# Patient Record
Sex: FEMALE | Race: WHITE | NOT HISPANIC OR LATINO | Employment: OTHER | URBAN - METROPOLITAN AREA
[De-identification: names, ages, dates, MRNs, and addresses within clinical notes are randomized per-mention and may not be internally consistent; named-entity substitution may affect disease eponyms.]

---

## 2017-03-03 ENCOUNTER — TRANSFERRED RECORDS (OUTPATIENT)
Dept: HEALTH INFORMATION MANAGEMENT | Facility: CLINIC | Age: 67
End: 2017-03-03

## 2018-01-19 ENCOUNTER — TRANSFERRED RECORDS (OUTPATIENT)
Dept: HEALTH INFORMATION MANAGEMENT | Facility: CLINIC | Age: 68
End: 2018-01-19

## 2018-09-05 ENCOUNTER — TRANSFERRED RECORDS (OUTPATIENT)
Dept: HEALTH INFORMATION MANAGEMENT | Facility: CLINIC | Age: 68
End: 2018-09-05

## 2021-06-17 ENCOUNTER — OFFICE VISIT (OUTPATIENT)
Dept: OPHTHALMOLOGY | Facility: CLINIC | Age: 71
End: 2021-06-17
Attending: OPHTHALMOLOGY
Payer: MEDICARE

## 2021-06-17 DIAGNOSIS — Q15.0 CONGENITAL GLAUCOMA OF LEFT EYE: Primary | ICD-10-CM

## 2021-06-17 DIAGNOSIS — Q11.1 ANOPHTHALMOS OF RIGHT EYE: ICD-10-CM

## 2021-06-17 DIAGNOSIS — Z96.1 PSEUDOPHAKIA OF LEFT EYE: ICD-10-CM

## 2021-06-17 DIAGNOSIS — H52.12 HIGH MYOPIA, LEFT EYE: ICD-10-CM

## 2021-06-17 PROCEDURE — G0463 HOSPITAL OUTPT CLINIC VISIT: HCPCS

## 2021-06-17 PROCEDURE — 92133 CPTRZD OPH DX IMG PST SGM ON: CPT | Performed by: OPHTHALMOLOGY

## 2021-06-17 PROCEDURE — 92083 EXTENDED VISUAL FIELD XM: CPT | Performed by: OPHTHALMOLOGY

## 2021-06-17 PROCEDURE — 99204 OFFICE O/P NEW MOD 45 MIN: CPT | Mod: GC | Performed by: OPHTHALMOLOGY

## 2021-06-17 RX ORDER — MAGNESIUM CITRATE
POWDER (GRAM) MISCELLANEOUS
COMMUNITY

## 2021-06-17 ASSESSMENT — REFRACTION_WEARINGRX
SPECS_TYPE: BIFOCAL
OS_AXIS: 060
OS_SPHERE: -6.50
OS_CYLINDER: +1.50
OS_ADD: +2.25

## 2021-06-17 ASSESSMENT — VISUAL ACUITY
CORRECTION_TYPE: GLASSES
OS_PH_CC: 20/20
OS_CC: 20/25
OD_CC: PROSTHETIC
OS_CC+: -2
METHOD: SNELLEN - LINEAR

## 2021-06-17 ASSESSMENT — CONF VISUAL FIELD
OD_SUPERIOR_TEMPORAL_RESTRICTION: 1
OS_NORMAL: 1
OD_SUPERIOR_NASAL_RESTRICTION: 1
OD_INFERIOR_TEMPORAL_RESTRICTION: 1
OD_INFERIOR_NASAL_RESTRICTION: 1

## 2021-06-17 ASSESSMENT — TONOMETRY
OD_IOP_MMHG: - -
OD_IOP_MMHG: --
OS_IOP_MMHG: 14
OS_IOP_MMHG: 16
IOP_METHOD: APPLANATION
IOP_METHOD: APPLANATION BY JMK

## 2021-06-17 ASSESSMENT — SLIT LAMP EXAM - LIDS
COMMENTS: NORMAL
COMMENTS: PROSTHESIS

## 2021-06-17 ASSESSMENT — CUP TO DISC RATIO: OS_RATIO: 0.4

## 2021-06-17 ASSESSMENT — EXTERNAL EXAM - RIGHT EYE: OD_EXAM: NORMAL

## 2021-06-17 ASSESSMENT — EXTERNAL EXAM - LEFT EYE: OS_EXAM: NORMAL

## 2021-06-17 NOTE — PROGRESS NOTES
Chief Complaint/Presenting Concern: Glaucoma evaluation    History of Present Illness:   Amie Chi is a 71 year old patient who presents for evaluation of glaucoma. Patient was previously living in [place], [place], and followed by Dr[facility], . Patient has bilateral congenital glaucoma and had surgery on both eyes at age 3 yo. 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 She is currently on Xelpros in the left eye at bedtime and has been on it for the past 1-2 years. She has tried latanoprost and another drop in the past but was allergic to the preservative, so she was switched to Xelpros.  Last eye exam was 3 months ago; had visual field at that time and was told it was stable for the past several years.     Relevant Past Medical/Family/Social History: CAD, HLD, hypothyroidism, GERD  Retired     Relevant Review of Systems: None relevant     Diagnosis: Congenital glaucoma left eye   Year diagnosis: 1951  Previous glaucoma surgery/laser  - glaucoma surgery age 3yo - likely ab externo trabeculotomy +/- trabeculectomy   Maximum intraocular pressure: 20 mmHg  Current Meds: Zelpros at bedtime in LEFT eye  Family history: negative  Gonio:  Left eye: superior PAS, otherwise wide open to CBB  Refractive status: Axial myopia  Trauma history: negative  Steroid exposure: negative  Vasospastic disease: Migrane/Raynaud phenomenon: positive as teenager, now she developed occasional ocular migraines  A past hemodynamic crisis or Low BP: negative  Meds AEs/intolerance: Xalatan (unable to tolerate due to preservative)  PMHx: No history of Asthma and respiratory problems/Cardiac/Renal/Kidney stones/Sulfa Allergy  Anticoagulants: ASA 81 mg     Today's testing:  IOP 14 mmHg by applanation  Visual field June 17, 2021:  Left eye (baseline) - depressed MD, Inferior arcuate, reliable  OCT Optic Nerve RNFL Spectralis June 17,  2021  left eye (baseline): poor tracing due to tilted nerve/myopia    Additional Ocular History:     2. Anophthalmos, right eye  - s/p enucleation ~1965 for recurrent corneal ulcer     3. High myopia with presbyopia      4. Pseudophakia left eye    - both eyes ~1996    5. History of retinal detachment, left eye   - scleral buckle?, ?PPV    6. Mild Epiretinal membrane    Plan/Recommendations:    Discussed findings with patient. IOP stable, within usual range as per patient. She will be sending over her records from [place].    Continue Xelpros at bedtime in the LEFT eye    Patient reports chronic photophobia that has worsened now as she moved to [place] due to more sunlight. Advised the patient to use artificial tears and assess if this help. Also advised to get new set of glasses that can help with UV exposure, will refer for refraction.    RTC in 4 months [facility], IOP    Jonathon Franco MD  Ophthalmology PGY-3  [facility]     Physician Attestation     Attending Physician Attestation:  Complete documentation of historical and exam elements from today's encounter can be found in the full encounter summary report (not reduplicated in this progress note). I personally obtained the chief complaint(s) and history of present illness. I confirmed and edited as necessary the review of systems, past medical/surgical history, family history, social history, and examination findings as documented by others; and I examined the patient myself. I personally reviewed the relevant tests, images, and reports as documented above. I personally reviewed the ophthalmic test(s) associated with this encounter, agree with the interpretation(s) as documented by the resident/fellow and have edited the corresponding report(s) as necessary. I formulated and edited as necessary the assessment and plan and discussed the findings and management plan with the patient and any family members present at the time of the visit.  Julio  JEAN CLAUDE Jesus., Glaucoma, June 17, 2021

## 2021-06-17 NOTE — NURSING NOTE
Chief Complaints and History of Present Illnesses   Patient presents with     Glaucoma     Chief Complaint(s) and History of Present Illness(es)     Glaucoma     Laterality: left eye    Quality: fluctuating    Treatment side effects: blurred vision    Compliance with Treatment: always              Comments     New pt here today establishing care, transferring from [place], [place].   HONEY was about 3 mos ago with Dr. [facility]te [facility] the [facility],  ([place], [place]).  Pt states was diagnosed around age 2 with bilateral congenital glaucoma - surgery was done.  During 4th grade & 9th grade had recurring corneal ulcer which resulted in enucleation of the RE.  Prosthetic currently in place. Was on pilocarpine until first year of college when drops were stopped.    Currently using Xelpros LE 1 gtts  Q HS. Pt has copy of drops Rx with her today.  Here today for glaucoma assessment & continuance of care.    Jayne ADAMS, PAGE 12:30 PM 06/17/2021

## 2021-10-19 ENCOUNTER — OFFICE VISIT (OUTPATIENT)
Dept: OPHTHALMOLOGY | Facility: CLINIC | Age: 71
End: 2021-10-19
Attending: OPHTHALMOLOGY
Payer: MEDICARE

## 2021-10-19 DIAGNOSIS — Q15.0 CONGENITAL GLAUCOMA OF LEFT EYE: Primary | ICD-10-CM

## 2021-10-19 DIAGNOSIS — H52.12 HIGH MYOPIA, LEFT EYE: ICD-10-CM

## 2021-10-19 DIAGNOSIS — Q11.1 ANOPHTHALMOS OF RIGHT EYE: ICD-10-CM

## 2021-10-19 DIAGNOSIS — Z96.1 PSEUDOPHAKIA OF LEFT EYE: ICD-10-CM

## 2021-10-19 PROCEDURE — G0463 HOSPITAL OUTPT CLINIC VISIT: HCPCS

## 2021-10-19 PROCEDURE — 99213 OFFICE O/P EST LOW 20 MIN: CPT | Performed by: OPHTHALMOLOGY

## 2021-10-19 ASSESSMENT — CONF VISUAL FIELD
OD_SUPERIOR_TEMPORAL_RESTRICTION: 1
OD_INFERIOR_NASAL_RESTRICTION: 1
METHOD: COUNTING FINGERS
OD_SUPERIOR_NASAL_RESTRICTION: 1
OD_INFERIOR_TEMPORAL_RESTRICTION: 1
OS_NORMAL: 1

## 2021-10-19 ASSESSMENT — VISUAL ACUITY
OD_CC: PROSTHETIC
OS_CC: 20/30
OS_PH_CC: 20/30
CORRECTION_TYPE: GLASSES
METHOD: SNELLEN - LINEAR
OS_PH_CC+: +1

## 2021-10-19 ASSESSMENT — SLIT LAMP EXAM - LIDS
COMMENTS: NORMAL
COMMENTS: PROSTHESIS

## 2021-10-19 ASSESSMENT — TONOMETRY
OS_IOP_MMHG: 12
OD_IOP_MMHG: PROS
OD_IOP_MMHG: PROS
IOP_METHOD: APPLANATION
OS_IOP_MMHG: 13
IOP_METHOD: APPLANATION

## 2021-10-19 ASSESSMENT — REFRACTION_WEARINGRX
SPECS_TYPE: BIFOCAL
OS_ADD: +2.25
OS_AXIS: 060
OS_SPHERE: -6.50
OS_CYLINDER: +1.50

## 2021-10-19 ASSESSMENT — EXTERNAL EXAM - RIGHT EYE: OD_EXAM: NORMAL

## 2021-10-19 ASSESSMENT — CUP TO DISC RATIO: OS_RATIO: 0.4

## 2021-10-19 ASSESSMENT — EXTERNAL EXAM - LEFT EYE: OS_EXAM: NORMAL

## 2021-10-19 NOTE — PROGRESS NOTES
Patient was previously living in [place], [place], and followed by Dr. [facility], . Patient has bilateral congenital glaucoma and had surgery on both eyes at age 3 yo. 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 She is currently on Xelpros in the left eye at bedtime and has been on it for the past 1-2 years. She has tried latanoprost and another drop in the past but was allergic to the preservative, so she was switched to Xelpros.  Last eye exam was 3 months ago; had visual field at that time and was told it was stable for the past several years.     Chief Complaint/Presenting Concern: Glaucoma evaluation    History of Present Illness:   Amie Chi is a 71 year old patient who presents for evaluation of glaucoma. Since last appointment, vision stable. No new eye pain, flashes, floaters. She is using xelpros at bedtime left eye.     Relevant Past Medical/Family/Social History: CAD, HLD, hypothyroidism, GERD  Retired     Relevant Review of Systems: None relevant     Diagnosis: Congenital glaucoma left eye   Year diagnosis: 1951  Previous glaucoma surgery/laser  - glaucoma surgery age 3yo - likely ab externo trabeculotomy +/- trabeculectomy   Maximum intraocular pressure: 20 mmHg  Current Meds: Zelpros at bedtime in LEFT eye  Family history: negative  Gonio:  Left eye: superior PAS, otherwise wide open to CBB  Refractive status: Axial myopia  Trauma history: negative  Steroid exposure: negative  Vasospastic disease: Migrane/Raynaud phenomenon: positive as teenager, now she developed occasional ocular migraines  A past hemodynamic crisis or Low BP: negative  Meds AEs/intolerance: Xalatan (unable to tolerate due to preservative)  PMHx: No history of Asthma and respiratory problems/Cardiac/Renal/Kidney stones/Sulfa Allergy  Anticoagulants: ASA 81 mg  Visual field June 17, 2021:  Left eye (baseline) - depressed MD,  Inferior arcuate, reliable  OCT Optic Nerve RNFL Spectralis June 17, 2021  left eye (baseline): poor tracing due to tilted nerve/myopia    Today's testing:  IOP 12 mmHg by applanation    Additional Ocular History:     2. Anophthalmos, right eye  - s/p enucleation ~1965 for recurrent corneal ulcer     3. High myopia with presbyopia      4. Pseudophakia left eye    - both eyes ~1996    5. History of retinal detachment, left eye   - scleral buckle?, ?PPV    6. Mild Epiretinal membrane    Plan/Recommendations:    Discussed findings with patient. IOP stable, within usual range as per patient.    Continue Xelpros at bedtime in the LEFT eye    RTC in 6 months VF left eye     Kathleen Moon MD  Ophthalmology Resident, PGY-3      Physician Attestation     Attending Physician Attestation:  Complete documentation of historical and exam elements from today's encounter can be found in the full encounter summary report (not reduplicated in this progress note). I personally obtained the chief complaint(s) and history of present illness. I confirmed and edited as necessary the review of systems, past medical/surgical history, family history, social history, and examination findings as documented by others; and I examined the patient myself. I personally reviewed the relevant tests, images, and reports as documented above. I formulated and edited as necessary the assessment and plan and discussed the findings and management plan with the patient and any family members present at the time of the visit.  Julio Jesus M.D., Glaucoma, October 19, 2021

## 2021-10-19 NOTE — NURSING NOTE
Chief Complaints and History of Present Illnesses   Patient presents with     Glaucoma Follow Up     4 month follow up both eyes.     Chief Complaint(s) and History of Present Illness(es)     Glaucoma Follow Up     Comments: 4 month follow up both eyes.              Comments     Pt states vision is about the same as last visit. No eye pain today.  No flashes or floaters. No redness or dryness.    MEGHA Mcpherson October 19, 2021 12:06 PM

## 2021-11-16 ENCOUNTER — TELEPHONE (OUTPATIENT)
Dept: OPHTHALMOLOGY | Facility: CLINIC | Age: 71
End: 2021-11-16
Payer: MEDICARE

## 2021-11-16 NOTE — TELEPHONE ENCOUNTER
[facility] Call Center    Phone Message    May a detailed message be left on voicemail: yes     Reason for Call: Other: Pt calling in as, per the clinic, she needed to r/s her Appt with Dr. Jesus for February. Pt states that she only needs to see a tech for a pressure check around 1/19/22. Please call pt to schedule tech only Appt around that date. Thank you.     Action Taken: Message routed to:  [facility] EYE    Travel Screening: Not Applicable

## 2021-11-16 NOTE — TELEPHONE ENCOUNTER
Called and spoke to Amie.     Spoke to Amie in regards to a Tech appt for pressure check. Amie was not feeling any pain or increase pressure in her eye but bf I made a tech appt for Amie I asked Dr. Claros's team if Amie needed to come in for a pressure check. Her team did not feel she needed to come in at this time. I relayed this message to Amie and I gave her my direct number if she feels any pressure or increase eye pain I would make her an appt. Amie was agreeable to this.     Jennifer Baxter Communication Facilitator on 11/16/2021 at 10:24 AM

## 2022-04-08 DIAGNOSIS — Q15.0 CONGENITAL GLAUCOMA OF LEFT EYE: Primary | ICD-10-CM

## 2022-04-08 RX ORDER — LATANOPROST 0.05 MG/ML
1 SOLUTION/ DROPS OPHTHALMIC; TOPICAL AT BEDTIME
Qty: 2.5 ML | Refills: 5 | Status: SHIPPED | OUTPATIENT
Start: 2022-04-08

## 2022-04-08 RX ORDER — LATANOPROST 0.05 MG/ML
SOLUTION/ DROPS OPHTHALMIC; TOPICAL
Status: CANCELLED | OUTPATIENT
Start: 2022-04-08

## 2022-04-08 NOTE — TELEPHONE ENCOUNTER
[facility] Call Center    Phone Message    May a detailed message be left on voicemail: yes     Reason for Call: Other: Patient called in with information for pharmacy where she wants her prescription filled at New phone number, [phone] New Fax [phone] Pharmacy's name [facility] Thank you     Must be sent over by 2:00pm as pt is out and needs ASAP    Action Taken: Message routed to:  [facility] E[facility]    Travel Screening: Not Applicable

## 2022-04-08 NOTE — TELEPHONE ENCOUNTER
[facility] Call Center    Phone Message    May a detailed message be left on voicemail: yes     Reason for Call: Other:  Pt called back to give information on another Pharmacy in [place].  [facility], [place], [place], Phone number, [phone].  Pt wanted to give another option for pharmacy that could fill the prescription of Xelpros 125mcg 0.05% eye drops, per Pt.  Please call Pt back to discuss.    Thank you.     Action Taken: Message routed to:  [facility]    Travel Screening: Not Applicable

## 2022-04-08 NOTE — TELEPHONE ENCOUNTER
[facility] Call Center    Phone Message    May a detailed message be left on voicemail: yes     Reason for Call: Medication Refill Request    Has the patient contacted the pharmacy for the refill? Yes   Name of medication being requested: Xelpros 125mcg 0.05%  Provider who prescribed the medication: Dr Jesus  Pharmacy: [facility]  [facility][note 3[facility] [phone]  Date medication is needed:   Pt is out of eye drops and is concerned that her eyes will now get worse if she doesn't get the Rx asap. She has tried latanoprost and another drop in the past but was allergic to the preservative, so she was switched to Xelpros. She would like the Rx to be sent to her local pharmacy instead of the [place] pharmacy on file.   Local pharmacy:   [facility], [address]  [phone]      Action Taken: Message routed to:  [facility]    Travel Screening: Not Applicable

## 2022-04-08 NOTE — TELEPHONE ENCOUNTER
Latanoprost (XELPROS) 0.005 % EMUL  Last Written Prescription Date:  ?  Last Fill Quantity: ?,   # refills: ?  Last Office Visit :  10/19/2021  Future Office visit:   4/19/2021   Julio Jesus MD  Ophthalmology    Plan/Recommendations:    Discussed findings with patient. IOP stable, within usual range as per patient.    Continue Xelpros at bedtime in the LEFT eye     RTC in 6 months VF left eye      Kathleen Moon MD  Ophthalmology Resident, PGY-3     Routing refill request to provider for review/approval because:  Xelpros not on updated med list.  Pt requesting this medication.   Pt has tried regular Lantoprost and other drops and she has had an allergic reaction.  So needing this drop for Pt care.   Thank you     Jane Michaels RN  Central Triage Red Flags/Med Refills

## 2022-04-11 ENCOUNTER — TELEPHONE (OUTPATIENT)
Dept: OPHTHALMOLOGY | Facility: CLINIC | Age: 72
End: 2022-04-11
Payer: MEDICARE

## 2022-04-11 NOTE — TELEPHONE ENCOUNTER
[facility] Call Center    Phone Message    May a detailed message be left on voicemail: yes     Reason for Call: Other: Pt called in again with concerns about having only one working eye. She has questions about her rx. Please call to discuss. Thank you     Action Taken: Message routed to:  [facility] Eye    Travel Screening: Not Applicable

## 2022-04-11 NOTE — TELEPHONE ENCOUNTER
Spoke with patient this morning and provided her with the address and phone number of the [facility] [place] where the Xelpros prescription was sent on 4/8/22, she receives these eye drops by mail.     Discussed if it takes <1 week to arrive she should be okay, however if it takes weeks to arrive we should discuss with Dr. Jesus and consider starting a more readily available eye drop in the meantime. Previously patient was sensitive to preservative containing drops with corneal irritation and eye redness. Patient conveyed understanding and will phone pharmacy, she will call our triage line if she has additional questions or concerns.     Sandi Handley MD  Ophthalmology Resident, PGY-3  [facility]

## 2022-04-11 NOTE — TELEPHONE ENCOUNTER
I spoke to the patient who notes that she spoke to one of the doctors this morning.   She is pleased that her prescription will get to her in two days.